# Patient Record
Sex: FEMALE | Race: NATIVE HAWAIIAN OR OTHER PACIFIC ISLANDER | HISPANIC OR LATINO
[De-identification: names, ages, dates, MRNs, and addresses within clinical notes are randomized per-mention and may not be internally consistent; named-entity substitution may affect disease eponyms.]

---

## 2017-06-19 ENCOUNTER — IMAGING SERVICES (OUTPATIENT)
Dept: OTHER | Age: 47
End: 2017-06-19

## 2017-07-11 ENCOUNTER — IMAGING SERVICES (OUTPATIENT)
Dept: OTHER | Age: 47
End: 2017-07-11

## 2018-06-25 ENCOUNTER — IMAGING SERVICES (OUTPATIENT)
Dept: OTHER | Age: 48
End: 2018-06-25

## 2019-07-01 ENCOUNTER — IMAGING SERVICES (OUTPATIENT)
Dept: OTHER | Age: 49
End: 2019-07-01

## 2021-07-08 ENCOUNTER — IMAGING SERVICES (OUTPATIENT)
Dept: OTHER | Age: 51
End: 2021-07-08

## 2022-01-11 ENCOUNTER — TELEPHONE (OUTPATIENT)
Dept: FAMILY MEDICINE CLINIC | Facility: CLINIC | Age: 52
End: 2022-01-11

## 2022-01-11 NOTE — TELEPHONE ENCOUNTER
PT NEEDS REFILL ON-    NIFEDIPINE ER 30MG    HAS A NEW PATIENT APPOINTMENT WITH MM 01/26/22. BUT IS OUT OF MEDICATION.       PLEASE ADVISE- [facility] [address] [place] [address], [address] [facility], [phone], [phone]

## 2022-01-11 NOTE — TELEPHONE ENCOUNTER
Patient has never been seen by an EMG provider.   Future Appointments   Date Time Provider Andrzej Villarreal   1/26/2022  1:20 PM Zachery Green MD [facility]     Patient advised to contact her former provider and have them send a short

## 2022-01-26 ENCOUNTER — OFFICE VISIT (OUTPATIENT)
Dept: FAMILY MEDICINE CLINIC | Facility: CLINIC | Age: 52
End: 2022-01-26
Payer: OTHER GOVERNMENT

## 2022-01-26 VITALS
HEIGHT: 61 IN | HEART RATE: 93 BPM | TEMPERATURE: 97 F | DIASTOLIC BLOOD PRESSURE: 90 MMHG | WEIGHT: 113.38 LBS | SYSTOLIC BLOOD PRESSURE: 120 MMHG | OXYGEN SATURATION: 99 % | BODY MASS INDEX: 21.41 KG/M2

## 2022-01-26 DIAGNOSIS — E78.00 HYPERCHOLESTEROLEMIA: ICD-10-CM

## 2022-01-26 DIAGNOSIS — Z86.2 HISTORY OF ANEMIA: ICD-10-CM

## 2022-01-26 DIAGNOSIS — Z83.3 FAMILY HISTORY OF DIABETES MELLITUS: ICD-10-CM

## 2022-01-26 DIAGNOSIS — I10 PRIMARY HYPERTENSION: Primary | ICD-10-CM

## 2022-01-26 DIAGNOSIS — L40.9 PSORIASIS: ICD-10-CM

## 2022-01-26 DIAGNOSIS — Z82.49 FAMILY HISTORY OF HEART DISEASE: ICD-10-CM

## 2022-01-26 LAB
ALBUMIN SERPL-MCNC: 3.9 G/DL (ref 3.4–5)
ALBUMIN/GLOB SERPL: 0.9 {RATIO} (ref 1–2)
ALP LIVER SERPL-CCNC: 65 U/L
ALT SERPL-CCNC: 14 U/L
ANION GAP SERPL CALC-SCNC: 3 MMOL/L (ref 0–18)
AST SERPL-CCNC: 16 U/L (ref 15–37)
BASOPHILS # BLD AUTO: 0.06 X10(3) UL (ref 0–0.2)
BASOPHILS NFR BLD AUTO: 1 %
BILIRUB SERPL-MCNC: 0.3 MG/DL (ref 0.1–2)
BUN BLD-MCNC: 10 MG/DL (ref 7–18)
CALCIUM BLD-MCNC: 8.7 MG/DL (ref 8.5–10.1)
CHLORIDE SERPL-SCNC: 106 MMOL/L (ref 98–112)
CHOLEST SERPL-MCNC: 254 MG/DL (ref ?–200)
CO2 SERPL-SCNC: 29 MMOL/L (ref 21–32)
CREAT BLD-MCNC: 0.54 MG/DL
EOSINOPHIL # BLD AUTO: 0.25 X10(3) UL (ref 0–0.7)
EOSINOPHIL NFR BLD AUTO: 4 %
ERYTHROCYTE [DISTWIDTH] IN BLOOD BY AUTOMATED COUNT: 16.5 %
EST. AVERAGE GLUCOSE BLD GHB EST-MCNC: 108 MG/DL (ref 68–126)
FASTING PATIENT LIPID ANSWER: NO
FASTING STATUS PATIENT QL REPORTED: NO
GLOBULIN PLAS-MCNC: 4.3 G/DL (ref 2.8–4.4)
GLUCOSE BLD-MCNC: 95 MG/DL (ref 70–99)
HBA1C MFR BLD: 5.4 % (ref ?–5.7)
HCT VFR BLD AUTO: 42.8 %
HDLC SERPL-MCNC: 66 MG/DL (ref 40–59)
HGB BLD-MCNC: 13.2 G/DL
IMM GRANULOCYTES # BLD AUTO: 0.03 X10(3) UL (ref 0–1)
IMM GRANULOCYTES NFR BLD: 0.5 %
LDLC SERPL CALC-MCNC: 174 MG/DL (ref ?–100)
LYMPHOCYTES # BLD AUTO: 2.32 X10(3) UL (ref 1–4)
LYMPHOCYTES NFR BLD AUTO: 37.4 %
MCH RBC QN AUTO: 27.1 PG (ref 26–34)
MCHC RBC AUTO-ENTMCNC: 30.8 G/DL (ref 31–37)
MCV RBC AUTO: 87.9 FL
MONOCYTES # BLD AUTO: 0.27 X10(3) UL (ref 0.1–1)
MONOCYTES NFR BLD AUTO: 4.3 %
NEUTROPHILS # BLD AUTO: 3.28 X10 (3) UL (ref 1.5–7.7)
NEUTROPHILS # BLD AUTO: 3.28 X10(3) UL (ref 1.5–7.7)
NEUTROPHILS NFR BLD AUTO: 52.8 %
NONHDLC SERPL-MCNC: 188 MG/DL (ref ?–130)
OSMOLALITY SERPL CALC.SUM OF ELEC: 285 MOSM/KG (ref 275–295)
PLATELET # BLD AUTO: 358 10(3)UL (ref 150–450)
POTASSIUM SERPL-SCNC: 3.8 MMOL/L (ref 3.5–5.1)
PROT SERPL-MCNC: 8.2 G/DL (ref 6.4–8.2)
RBC # BLD AUTO: 4.87 X10(6)UL
SODIUM SERPL-SCNC: 138 MMOL/L (ref 136–145)
TRIGL SERPL-MCNC: 81 MG/DL (ref 30–149)
VLDLC SERPL CALC-MCNC: 16 MG/DL (ref 0–30)
WBC # BLD AUTO: 6.2 X10(3) UL (ref 4–11)

## 2022-01-26 PROCEDURE — 85025 COMPLETE CBC W/AUTO DIFF WBC: CPT | Performed by: FAMILY MEDICINE

## 2022-01-26 PROCEDURE — 80053 COMPREHEN METABOLIC PANEL: CPT | Performed by: FAMILY MEDICINE

## 2022-01-26 PROCEDURE — 80061 LIPID PANEL: CPT | Performed by: FAMILY MEDICINE

## 2022-01-26 PROCEDURE — 83036 HEMOGLOBIN GLYCOSYLATED A1C: CPT | Performed by: FAMILY MEDICINE

## 2022-01-26 PROCEDURE — 3008F BODY MASS INDEX DOCD: CPT | Performed by: FAMILY MEDICINE

## 2022-01-26 PROCEDURE — 3074F SYST BP LT 130 MM HG: CPT | Performed by: FAMILY MEDICINE

## 2022-01-26 PROCEDURE — 3080F DIAST BP >= 90 MM HG: CPT | Performed by: FAMILY MEDICINE

## 2022-01-26 PROCEDURE — 99203 OFFICE O/P NEW LOW 30 MIN: CPT | Performed by: FAMILY MEDICINE

## 2022-01-26 RX ORDER — HYDROXYZINE HYDROCHLORIDE 10 MG/1
10 TABLET, FILM COATED ORAL 3 TIMES DAILY PRN
COMMUNITY

## 2022-01-26 RX ORDER — TOLTERODINE 2 MG/1
2 CAPSULE, EXTENDED RELEASE ORAL DAILY
COMMUNITY

## 2022-01-26 RX ORDER — CLOBETASOL PROPIONATE 0.5 MG/G
OINTMENT TOPICAL 2 TIMES DAILY
COMMUNITY

## 2022-01-26 RX ORDER — NIFEDIPINE 30 MG/1
30 TABLET, FILM COATED, EXTENDED RELEASE ORAL DAILY
COMMUNITY

## 2022-01-26 RX ORDER — CLOBETASOL PROPIONATE 0.46 MG/ML
SOLUTION TOPICAL 2 TIMES DAILY
COMMUNITY

## 2022-01-26 NOTE — PROGRESS NOTES
[facility] Family Medicine Office Note  Chief Complaint:   Patient presents with:  Establish Care      HPI:   This is a 49year old female coming in [place] [address] care with me today - moved from [facility].      Follows dermatologist for her Psorias Secukinumab (COSENTYX SC) Inject into the skin. • hydrOXYzine 10 MG Oral Tab Take 10 mg by mouth 3 (three) times daily as needed for Itching. • Clobetasol Propionate 0.05 % External Ointment Apply topically 2 (two) times daily.      • Clobetasol Pro care with Derm. - DERM - INTERNAL    RHM discussed and the need for PAP and physical discussed. Labs obtained today - will review this - lIpids, CT heart score and her history to discussed the need for statin, considering strong FH of the same.        P

## 2022-01-31 ENCOUNTER — TELEPHONE (OUTPATIENT)
Dept: FAMILY MEDICINE CLINIC | Facility: CLINIC | Age: 52
End: 2022-01-31

## 2022-01-31 NOTE — TELEPHONE ENCOUNTER
[address] [facility] called and asked that a referral be sent for [facility] to see Dr. Elicia Jessica

## 2022-01-31 NOTE — TELEPHONE ENCOUNTER
Left message for Darian Patino to let us know when pt's appointment is. Note sent to referrals department to check on status of referral.        Need fax number to send order.

## 2022-02-02 NOTE — TELEPHONE ENCOUNTER
Derm referral approved. Details in Epic. Start Date 1/26/22 through 1/26/23.  3 visits authorized. Referral #[id number]. Note included in referral can see another provider. Referral faxed to # below.

## 2022-02-02 NOTE — TELEPHONE ENCOUNTER
Yulia Caraballo from [employer] called and requested that the actual referral from [address] [place] [address] be entered into the [address] and then faxed to her at [phone].

## 2022-02-03 NOTE — TELEPHONE ENCOUNTER
Referra;l request faxed to [facility] @ [phone] to see  Dr. Jose Perera Md NPI [phone]

## 2022-02-03 NOTE — TELEPHONE ENCOUNTER
[facility] again states received referral but due to pt having [place] referral has to come from [facility] otherwise can not be accepted referral received is from [employer] back # [phone]    Fax# [phone]

## 2022-02-08 RX ORDER — NIFEDIPINE 30 MG/1
30 TABLET, FILM COATED, EXTENDED RELEASE ORAL DAILY
Qty: 90 TABLET | Refills: 1 | Status: SHIPPED | OUTPATIENT
Start: 2022-02-08

## 2022-02-08 RX ORDER — TOLTERODINE 2 MG/1
2 CAPSULE, EXTENDED RELEASE ORAL DAILY
Qty: 90 CAPSULE | Refills: 1 | Status: SHIPPED | OUTPATIENT
Start: 2022-02-08 | End: 2022-02-09

## 2022-02-08 NOTE — TELEPHONE ENCOUNTER
No refill protocol for this medication. tolterodine tartrate   Last refill: Dr. Lisa Franco has never refilled this medication. Last Visit: 1/26/2022  Next Visit:   Future Appointments   Date Time Provider Andrzej Cherelle   3/30/2022  1:20 PM Zachery Foreman MD [facility]       Nifedipine has been sent per protocol. Forward to Dr. Lisa Franco please advise on refills. Thanks.

## 2022-02-08 NOTE — TELEPHONE ENCOUNTER
NIFEdipine 30 MG Oral Tablet 24 Hr    tolterodine tartrate 2 MG Oral Capsule  [facility] [phone], [phone]      Pt called requesting a refill on medication     Pt call back # [phone]    Thank you

## 2022-02-09 ENCOUNTER — TELEPHONE (OUTPATIENT)
Dept: FAMILY MEDICINE CLINIC | Facility: CLINIC | Age: 52
End: 2022-02-09

## 2022-02-09 RX ORDER — TOLTERODINE 2 MG/1
2 CAPSULE, EXTENDED RELEASE ORAL DAILY
Qty: 90 CAPSULE | Refills: 1 | Status: SHIPPED | OUTPATIENT
Start: 2022-02-09

## 2022-02-09 NOTE — TELEPHONE ENCOUNTER
[facility] [phone] Spoke with Domingo Boyce and verified that the referral was approved valid 2/3/22-2/3/23  Ref# [id number]

## 2022-02-09 NOTE — TELEPHONE ENCOUNTER
Patient called in requesting that both of her [facility].  Please Fix    Thank you      tolterodine tartrate 2 MG Oral Capsule SR 24 Hr  NIFEdipine 30 MG Oral Tablet 24 Hr

## 2022-03-02 ENCOUNTER — HOSPITAL ENCOUNTER (OUTPATIENT)
Dept: CT IMAGING | Age: 52
Discharge: HOME | End: 2022-03-02
Attending: FAMILY MEDICINE

## 2022-03-02 DIAGNOSIS — Z82.49 FAMILY HISTORY OF HEART DISEASE: ICD-10-CM

## 2022-03-02 DIAGNOSIS — I10 PRIMARY HYPERTENSION: ICD-10-CM

## 2022-03-04 NOTE — PROGRESS NOTES
She just had her physical in Jan 2022. If she has further questions regarding the medication. I would be happy to discuss further on a video visit.  Agree with plan as above. ~ MM

## 2022-03-07 ENCOUNTER — TELEPHONE (OUTPATIENT)
Dept: FAMILY MEDICINE CLINIC | Facility: CLINIC | Age: 52
End: 2022-03-07

## 2022-03-07 RX ORDER — ATORVASTATIN CALCIUM 10 MG/1
10 TABLET, FILM COATED ORAL NIGHTLY
Qty: 90 TABLET | Refills: 0 | Status: SHIPPED | OUTPATIENT
Start: 2022-03-07 | End: 2022-06-05

## 2022-03-07 NOTE — TELEPHONE ENCOUNTER
If she starts it now we can consider rechecking at that time. If patient is ok with this please send Rx Atorvastatin 10 mg once daily X 90 days for now.  We can recheck her labs when she comes in for her physical.

## 2022-03-07 NOTE — TELEPHONE ENCOUNTER
PT called to reschedule appointment. She wanted to know, however, if she should be starting cholesterol medication before appointment. Please advise.     Call back number:  [phone]

## 2022-03-07 NOTE — TELEPHONE ENCOUNTER
Reviewed LOV 01/26/2022  Per MM \"Last time labs were checked 2-3 years ago cholesterol was elevated. Everything else looked good per patient, ?  Unsure\"    Last lipid panel 01/26/2022    Completed CT Calcium Scoring 03/02/2022    Pt inquiring if should start statin medication prior to future appt - annual physical  Future Appointments   Date Time Provider Andrzej Villarreal   4/27/2022  1:20 PM Ed Ahumada, Mydhili, MD [facility]     Please advise, thank you

## 2022-03-30 ENCOUNTER — HOSPITAL ENCOUNTER (OUTPATIENT)
Age: 52
Discharge: HOME | End: 2022-03-30
Payer: OTHER GOVERNMENT

## 2022-03-30 ENCOUNTER — TELEPHONE (OUTPATIENT)
Dept: FAMILY MEDICINE CLINIC | Facility: CLINIC | Age: 52
End: 2022-03-30

## 2022-03-30 VITALS
HEIGHT: 61 IN | TEMPERATURE: 99 F | SYSTOLIC BLOOD PRESSURE: 143 MMHG | HEART RATE: 103 BPM | WEIGHT: 113 LBS | OXYGEN SATURATION: 95 % | BODY MASS INDEX: 21.34 KG/M2 | DIASTOLIC BLOOD PRESSURE: 84 MMHG | RESPIRATION RATE: 12 BRPM

## 2022-03-30 DIAGNOSIS — R10.9 ABDOMINAL PAIN OF UNKNOWN ETIOLOGY: Primary | ICD-10-CM

## 2022-03-30 DIAGNOSIS — K62.5 RECTAL BLEEDING: ICD-10-CM

## 2022-03-30 PROCEDURE — 99205 OFFICE O/P NEW HI 60 MIN: CPT | Performed by: NURSE PRACTITIONER

## 2022-03-30 RX ORDER — CETIRIZINE HYDROCHLORIDE 10 MG/1
10 TABLET ORAL DAILY
COMMUNITY

## 2022-03-30 NOTE — TELEPHONE ENCOUNTER
Pt reports last night - c/o stomach hurting really bad - around 8pm    Would feel stomach like \"growling\" - then abd hurts really bad    Even buttocks really hurting - \"like she gave birth\"    Went to bathroom - no bowel movement, but threw up    After 1 hr or so - had half diarrhea, half formed bowel movement    When using toilet - c/o really sweating  When done - pt c/o chills, really cold    Used bathroom today - had Pink-red mixed watery blood - in toilet bowl    Pt denies any new foods or changes to diet    Advised pt that Dr. Patiño Re not in office today, but will send to covering providers    Please advise, thank you

## 2022-03-30 NOTE — ED INITIAL ASSESSMENT (HPI)
Pt sts last night began with diffuse abd and lower back pain. Nausea, emesis x 4, many diarrhea episodes. This morning with rectal pressure and then bloody stools \"every 30\"-1 hour. \". Reports urinary frequency but no dysuria. Denies fever.

## 2022-03-30 NOTE — TELEPHONE ENCOUNTER
Having abdominal pain w/ diarrhea    Also, reports some blood in stool    Vomiting last night but not today    Please adv    Thank you

## 2022-04-27 ENCOUNTER — OFFICE VISIT (OUTPATIENT)
Dept: FAMILY MEDICINE CLINIC | Facility: CLINIC | Age: 52
End: 2022-04-27
Payer: OTHER GOVERNMENT

## 2022-04-27 ENCOUNTER — MED REC SCAN ONLY (OUTPATIENT)
Dept: FAMILY MEDICINE CLINIC | Facility: CLINIC | Age: 52
End: 2022-04-27

## 2022-04-27 VITALS
HEIGHT: 61 IN | TEMPERATURE: 98 F | RESPIRATION RATE: 16 BRPM | WEIGHT: 111 LBS | DIASTOLIC BLOOD PRESSURE: 80 MMHG | SYSTOLIC BLOOD PRESSURE: 124 MMHG | BODY MASS INDEX: 20.96 KG/M2 | HEART RATE: 82 BPM | OXYGEN SATURATION: 98 %

## 2022-04-27 DIAGNOSIS — Z00.00 WELLNESS EXAMINATION: Primary | ICD-10-CM

## 2022-04-27 DIAGNOSIS — E78.00 HYPERCHOLESTEROLEMIA: ICD-10-CM

## 2022-04-27 DIAGNOSIS — Z87.19 HISTORY OF COLITIS: ICD-10-CM

## 2022-04-27 DIAGNOSIS — Z01.419 ENCOUNTER FOR WELL WOMAN EXAM: ICD-10-CM

## 2022-04-27 DIAGNOSIS — I10 PRIMARY HYPERTENSION: ICD-10-CM

## 2022-04-27 DIAGNOSIS — Z12.31 SCREENING MAMMOGRAM FOR BREAST CANCER: ICD-10-CM

## 2022-04-27 DIAGNOSIS — R93.1 AGATSTON CAC SCORE 200-399: ICD-10-CM

## 2022-04-27 DIAGNOSIS — Z12.4 PAPANICOLAOU SMEAR FOR CERVICAL CANCER SCREENING: ICD-10-CM

## 2022-04-27 DIAGNOSIS — Z12.11 ENCOUNTER FOR SCREENING COLONOSCOPY: ICD-10-CM

## 2022-04-27 PROBLEM — L40.9 PSORIASIS: Status: ACTIVE | Noted: 2022-04-27

## 2022-04-27 PROBLEM — N32.81 OVERACTIVE BLADDER: Status: ACTIVE | Noted: 2022-04-27

## 2022-04-27 PROBLEM — E78.2 MIXED HYPERLIPIDEMIA: Status: ACTIVE | Noted: 2022-04-27

## 2022-04-27 LAB
CYTOLOGY CVX/VAG DOC THIN PREP: NORMAL
HPV16+18+45 E6+E7MRNA CVX NAA+PROBE: NEGATIVE

## 2022-04-27 PROCEDURE — 3079F DIAST BP 80-89 MM HG: CPT | Performed by: FAMILY MEDICINE

## 2022-04-27 PROCEDURE — 3008F BODY MASS INDEX DOCD: CPT | Performed by: FAMILY MEDICINE

## 2022-04-27 PROCEDURE — 90471 IMMUNIZATION ADMIN: CPT | Performed by: FAMILY MEDICINE

## 2022-04-27 PROCEDURE — 90750 HZV VACC RECOMBINANT IM: CPT | Performed by: FAMILY MEDICINE

## 2022-04-27 PROCEDURE — 87624 HPV HI-RISK TYP POOLED RSLT: CPT | Performed by: FAMILY MEDICINE

## 2022-04-27 PROCEDURE — 3074F SYST BP LT 130 MM HG: CPT | Performed by: FAMILY MEDICINE

## 2022-04-27 PROCEDURE — 99396 PREV VISIT EST AGE 40-64: CPT | Performed by: FAMILY MEDICINE

## 2022-04-28 ENCOUNTER — TELEPHONE (OUTPATIENT)
Dept: FAMILY MEDICINE CLINIC | Facility: CLINIC | Age: 52
End: 2022-04-28

## 2022-04-28 LAB — HPV I/H RISK 1 DNA SPEC QL NAA+PROBE: NEGATIVE

## 2022-04-28 NOTE — TELEPHONE ENCOUNTER
Patient states that [employer] office does not except [address] [place] [address] Way, only  for life.  Patient called  and they informed her that they havent received referral.

## 2022-04-28 NOTE — TELEPHONE ENCOUNTER
[facility] Customer Service - [phone]    Staff stated that regular turn around time with referral authorizations is 2-3 days - referral orders only placed yesterday  Was only able to see Derm referral - this was placed 01/2022    Advised of note below - staff stated that if referral approved or denied - pt will have to call [address] [place] [address] Way - may approve out of network provider if pt has not seen provider in past - otherwise, awaiting referral auth at this time      Routing to referrals dept - please advise, thank you!

## 2022-04-29 ENCOUNTER — TELEPHONE (OUTPATIENT)
Dept: FAMILY MEDICINE CLINIC | Facility: CLINIC | Age: 52
End: 2022-04-29

## 2022-04-29 NOTE — TELEPHONE ENCOUNTER
Patient returning call this morning. I informed patient to listen to her VM as it was stated that a detail message was left on her VM. I informed patient to call her insurance [facility] and see who is in her network as far as [employer] and to call us back with that information, and or see what the out of network costs would be.

## 2022-04-29 NOTE — TELEPHONE ENCOUNTER
Advised by referrals dept - They authorize for Tracking only for [employer] Pt needs to find provider that takes her insurance. Once pt finds provider, will need to update referral with correct provider. Left detailed message to voicemail (per verbal release form consent with confirmed identifying message) of note above. Patient advised to call office back with GI provider and/or with any questions/concerns.

## 2022-05-02 ENCOUNTER — TELEPHONE (OUTPATIENT)
Dept: FAMILY MEDICINE CLINIC | Facility: CLINIC | Age: 52
End: 2022-05-02

## 2022-05-02 NOTE — TELEPHONE ENCOUNTER
Called and spoke with pt - advised of new GI referral for Dr. Joe Galvin stated \"Dr. Chery Quezada at [facility]" -  Unable to locate this provider    Pt was also given name - Jazmyn Lepe MD at [facility] from insurance     New referral placed for Jazmyn Lepe MD    Pt agreeable to receive [facility] with contact info

## 2022-05-02 NOTE — TELEPHONE ENCOUNTER
PATIENT STATES THAT HER INSURANCE WON'T COVER THE GASTRO DR REFERRED HER TO, INSURANCE WILL COVER DR Digna PAUL AT [address] [facility] PUT IN NEW REFERRAL

## 2022-05-07 ENCOUNTER — TELEPHONE (OUTPATIENT)
Dept: FAMILY MEDICINE CLINIC | Facility: CLINIC | Age: 52
End: 2022-05-07

## 2022-05-16 ENCOUNTER — TELEPHONE (OUTPATIENT)
Dept: FAMILY MEDICINE CLINIC | Facility: CLINIC | Age: 52
End: 2022-05-16

## 2022-05-16 NOTE — TELEPHONE ENCOUNTER
----- Message from Zachery Zavala MD sent at 5/14/2022 11:28 PM CDT -----  Pap smear - normal and no evidence of the high risk HPV virus. We still highly recommend annual physical exams, however, if there are no changes to sexual health / history PAP smears may be done every 3-5 years.

## 2022-06-06 ENCOUNTER — TELEPHONE (OUTPATIENT)
Dept: FAMILY MEDICINE CLINIC | Facility: CLINIC | Age: 52
End: 2022-06-06

## 2022-06-06 NOTE — TELEPHONE ENCOUNTER
Letter mailed to patient reminding her she has overdue orders.     Lab Frequency Next Occurrence   LEOLA SCREENING BILAT (CPT=77067) Once 04/27/2022

## 2022-06-08 RX ORDER — ATORVASTATIN CALCIUM 10 MG/1
10 TABLET, FILM COATED ORAL NIGHTLY
Qty: 90 TABLET | Refills: 1 | Status: SHIPPED | OUTPATIENT
Start: 2022-06-08 | End: 2022-12-05

## 2022-06-08 NOTE — TELEPHONE ENCOUNTER
LOV 04/27/2022  Last labs 01/26/2022  Last refill on 03/07/2022, for #90 tabs, with 0 refills  atorvastatin 10 MG Oral Tab   Cholesterol Medication Protocol Passed 06/08/2022 01:57 PM   Protocol Details  ALT < 80    ALT resulted within past year    Lipid panel within past 12 months    Appointment within past 12 or next 3 months     Future Appointments   Date Time Provider Andrzej Villarreal   6/25/2022  8:30 AM  [facility] EM[facility]   7/21/2022  2:45 [place] [address] [address] SUB GI       Order per protocol  [facility]

## 2022-06-08 NOTE — TELEPHONE ENCOUNTER
PT CALLED AND ADV IS NEEDING REFILL OF     atorvastatin 10 MG Oral Tab     ** PT WANTED TO KNOW IF THIS IS A MEDICATION THAT SHE IS SUPPOSE TO CONTINUE TO TAKE? **      PLEASE ADV      WOULD NEED 90 DAY SUPPLY SENT TO [facility]      THANK YOU

## 2022-06-25 ENCOUNTER — NURSE ONLY (OUTPATIENT)
Dept: FAMILY MEDICINE CLINIC | Facility: CLINIC | Age: 52
End: 2022-06-25
Payer: OTHER GOVERNMENT

## 2022-06-25 DIAGNOSIS — E78.00 HYPERCHOLESTEROLEMIA: ICD-10-CM

## 2022-06-25 LAB
CHOLEST SERPL-MCNC: 176 MG/DL (ref ?–200)
FASTING PATIENT LIPID ANSWER: YES
HDLC SERPL-MCNC: 68 MG/DL (ref 40–59)
LDLC SERPL CALC-MCNC: 95 MG/DL (ref ?–100)
NONHDLC SERPL-MCNC: 108 MG/DL (ref ?–130)
TRIGL SERPL-MCNC: 69 MG/DL (ref 30–149)
VLDLC SERPL CALC-MCNC: 11 MG/DL (ref 0–30)

## 2022-06-25 PROCEDURE — 80061 LIPID PANEL: CPT | Performed by: FAMILY MEDICINE

## 2022-06-25 PROCEDURE — 90471 IMMUNIZATION ADMIN: CPT | Performed by: FAMILY MEDICINE

## 2022-06-25 PROCEDURE — 90750 HZV VACC RECOMBINANT IM: CPT | Performed by: FAMILY MEDICINE

## 2022-06-25 NOTE — PROGRESS NOTES
Pt was in office for labs per MM and 2nd shingles vaccination    1 mint tbue collected from [facility] using butterfly needle. Pt also received 2nd singles vaccination- order placed by Dr. Wilian Nguyễn. Vaccination was given in L deltoid .     Pt tolerated and was sent home in stable condition

## 2022-07-13 ENCOUNTER — ORDER TRANSCRIPTION (OUTPATIENT)
Dept: ADMINISTRATIVE | Facility: HOSPITAL | Age: 52
End: 2022-07-13

## 2022-07-13 DIAGNOSIS — Z01.818 PRE-OP TESTING: ICD-10-CM

## 2022-07-13 DIAGNOSIS — Z11.59 ENCOUNTER FOR SCREENING FOR OTHER VIRAL DISEASES: Primary | ICD-10-CM

## 2022-07-14 ENCOUNTER — TELEPHONE (OUTPATIENT)
Dept: FAMILY MEDICINE CLINIC | Facility: CLINIC | Age: 52
End: 2022-07-14

## 2022-07-14 DIAGNOSIS — Z87.19 HISTORY OF COLITIS: ICD-10-CM

## 2022-07-14 DIAGNOSIS — Z12.11 ENCOUNTER FOR SCREENING COLONOSCOPY: Primary | ICD-10-CM

## 2022-07-14 NOTE — TELEPHONE ENCOUNTER
PATIENT SCHEDULED FOR COLONOSCOPY ON July 21 WITH  [facility]. PATIENT NOT SEEING DR TIERNEY.  PATIENT ASKING FOR REFERRAL

## 2022-07-14 NOTE — TELEPHONE ENCOUNTER
Please refer to Danielle 04/28/22, 04/29/22, 05/02/22  (Dr. Raquel Del Rio referral - pt stated not in network, new GI referral orders placed)  Unsure how pt able to schedule with Dr. Raquel Del Rio    Attempted to call pt - unavailable at this time - spoke with spouse - ok per verbal consent    Advised pt's spouse of note above - regarding GI referrals placed - stating Dr. Raquel Del Rio is NOT covered   So new GI referral placed for Dr. Angeline Caraballo asked if pt can call office back to discuss - awaiting call back from pt at this time

## 2022-07-25 ENCOUNTER — TELEPHONE (OUTPATIENT)
Dept: FAMILY MEDICINE CLINIC | Facility: CLINIC | Age: 52
End: 2022-07-25

## 2022-07-25 NOTE — TELEPHONE ENCOUNTER
PT CALLED AND ADV NEEDS NEW REFERRAL PLACED FOR GASTRO  PT ADV  [facility] DOES NOT ACCEPT  [facility].    WAS GIVEN A DR FROM [facility]:    DR Hiwot Blackman    [facility] [address]  [employer] [place]  -[phone]  FAX; [address] [facility]

## 2022-07-25 NOTE — TELEPHONE ENCOUNTER
GI referral for Dr. Cedric Valencia - placed on 05/02/2022    Faxed Referral order requisition to Dr. Corrine Fernández at fax #[phone]    [facility] sent to pt  Notify me if not read by 8/1/22

## 2022-08-09 RX ORDER — NIFEDIPINE 30 MG/1
30 TABLET, FILM COATED, EXTENDED RELEASE ORAL DAILY
Qty: 90 TABLET | Refills: 1 | Status: SHIPPED | OUTPATIENT
Start: 2022-08-09

## 2022-08-09 RX ORDER — TOLTERODINE 2 MG/1
2 CAPSULE, EXTENDED RELEASE ORAL DAILY
Qty: 90 CAPSULE | Refills: 1 | Status: SHIPPED | OUTPATIENT
Start: 2022-08-09

## 2022-08-09 NOTE — TELEPHONE ENCOUNTER
Requests refill      NIFEdipine 30 MG Oral Tablet 24 Hr    tolterodine tartrate 2 MG Oral Capsule SR 24 Hr    [facility]

## 2022-08-09 NOTE — TELEPHONE ENCOUNTER
Nifedipine sent per protocol. No refill protocol for this medication. Tolterodine:  Last refill: 2/09/2022 #90 with 1 refill  Last Visit: 4/27/2022  Next Visit:   No Future Appointments         Forward to Dr. Jarod Frederick please advise on refills. Thanks.

## 2022-08-20 ENCOUNTER — HOSPITAL ENCOUNTER (OUTPATIENT)
Dept: MAMMOGRAPHY | Age: 52
Discharge: HOME | End: 2022-08-20
Attending: FAMILY MEDICINE
Payer: OTHER GOVERNMENT

## 2022-08-20 ENCOUNTER — IMAGING SERVICES (OUTPATIENT)
Dept: OTHER | Age: 52
End: 2022-08-20

## 2022-08-20 DIAGNOSIS — Z12.31 SCREENING MAMMOGRAM FOR BREAST CANCER: ICD-10-CM

## 2022-08-20 PROCEDURE — 77063 BREAST TOMOSYNTHESIS BI: CPT | Performed by: FAMILY MEDICINE

## 2022-08-20 PROCEDURE — 77067 SCR MAMMO BI INCL CAD: CPT | Performed by: FAMILY MEDICINE

## 2022-09-21 ENCOUNTER — LAB ENCOUNTER (OUTPATIENT)
Dept: LAB | Age: 52
End: 2022-09-21
Attending: INTERNAL MEDICINE

## 2022-09-21 DIAGNOSIS — Z11.59 ENCOUNTER FOR SCREENING FOR OTHER VIRAL DISEASES: ICD-10-CM

## 2022-09-21 DIAGNOSIS — Z01.818 PRE-OP TESTING: ICD-10-CM

## 2022-09-22 LAB — SARS-COV-2 RNA RESP QL NAA+PROBE: NOT DETECTED

## 2022-09-24 ENCOUNTER — HOSPITAL ENCOUNTER (OUTPATIENT)
Dept: CV DIAGNOSTICS | Facility: HOSPITAL | Age: 52
Discharge: HOME | End: 2022-09-24
Attending: INTERNAL MEDICINE

## 2022-09-24 DIAGNOSIS — R93.1 AGATSTON CORONARY ARTERY CALCIUM SCORE BETWEEN 200 AND 399: ICD-10-CM

## 2022-09-24 DIAGNOSIS — E78.2 MIXED HYPERLIPIDEMIA: ICD-10-CM

## 2022-09-24 DIAGNOSIS — I10 ESSENTIAL HYPERTENSION: ICD-10-CM

## 2022-09-24 PROCEDURE — 93350 STRESS TTE ONLY: CPT | Performed by: INTERNAL MEDICINE

## 2022-09-24 PROCEDURE — 93018 CV STRESS TEST I&R ONLY: CPT | Performed by: INTERNAL MEDICINE

## 2022-09-24 PROCEDURE — 93017 CV STRESS TEST TRACING ONLY: CPT | Performed by: INTERNAL MEDICINE

## 2022-09-26 ENCOUNTER — TELEPHONE (OUTPATIENT)
Dept: FAMILY MEDICINE CLINIC | Facility: CLINIC | Age: 52
End: 2022-09-26

## 2022-09-26 NOTE — TELEPHONE ENCOUNTER
Patient states that [facility] did not receive the prior authorization for Dr. Ngozi Byrd for her colonoscopy tomorrow. The patient is already fasting.      [phone]

## 2022-09-26 NOTE — TELEPHONE ENCOUNTER
Dr. Santiago Chandra office states pt needs a referral. They say they can see referral in system but insurance is stating it has to be initiated through  by Dr. Clifford Otero. [employer] phone:  [phone]. Dr. Santiago Chandra office is cancelling pt's procedure until all is cleared. Thank you!

## 2022-09-26 NOTE — TELEPHONE ENCOUNTER
Attempted to call [facility] at number provided, automated system, when choosing provider, asks to key in \"code\". Not aware of what the \"code\" number is. Immediately attempted to call Emily Pepper at Dr. Gema Rosado office at [phone], answering service states they are on lunch until 1300.     1315-Called and spoke with Emily Pepper at Dr. Gema Rosado office. Discussed referral process and that initial referral has been placed, normal process is specialist then places and initiates all referrals for procedures. Emily Pepper to call [facility] back to find solution. 1323-Called and spoke to pt to let her know that case is being worked on, either Emily Pepper or I will call back with answer.

## 2022-09-26 NOTE — TELEPHONE ENCOUNTER
Rec'd fax from [facility]. Paperwork filled out and faxed to [phone]. Will send to scanning, copy kept at desk for any immediate needs.

## 2022-09-26 NOTE — TELEPHONE ENCOUNTER
Rec'd callback from Jesse Gill at Dr. Ivis Alexander office. States she spoke with [facility], they are requiring the PCP office to fill out a paper request and fax it to [address] [place] [address]. Once it is in their system, Dr. Ivis Alexander office can try to reapprove colonscopy. Awaiting fax.

## 2022-10-31 ENCOUNTER — TELEPHONE (OUTPATIENT)
Dept: FAMILY MEDICINE CLINIC | Facility: CLINIC | Age: 52
End: 2022-10-31

## 2022-10-31 NOTE — TELEPHONE ENCOUNTER
Pt states that  [employer]) never got referral letter for Dr. Lora Johnson - cardiologist. Pt needs referral letter sent to [address] [place] [address]. Thank you!

## 2022-10-31 NOTE — TELEPHONE ENCOUNTER
[address] Patient Referral Authorization Form    Form completed - referral order requisition (placed on 04/27/2022 for Cards, Dr. Ezequiel Zelaya)  Faxed to [facility] at fax #[phone]    Send copy to [facility] sent to pt  Notify me if not read by 11/07/22

## 2022-11-28 ENCOUNTER — TELEPHONE (OUTPATIENT)
Dept: FAMILY MEDICINE CLINIC | Facility: CLINIC | Age: 52
End: 2022-11-28

## 2022-11-28 DIAGNOSIS — E78.00 HYPERCHOLESTEROLEMIA: ICD-10-CM

## 2022-11-28 DIAGNOSIS — I10 PRIMARY HYPERTENSION: Primary | ICD-10-CM

## 2022-11-28 DIAGNOSIS — R93.1 AGATSTON CAC SCORE 200-399: ICD-10-CM

## 2022-11-28 RX ORDER — ATORVASTATIN CALCIUM 10 MG/1
TABLET, FILM COATED ORAL
Qty: 90 TABLET | Refills: 3 | Status: SHIPPED | OUTPATIENT
Start: 2022-11-28

## 2022-11-28 NOTE — TELEPHONE ENCOUNTER
Pt called she said referral for Dr. Tariq Mcdowell is incomplete per insurance. She did already have a visit with him but does not know when she seen him. She is needing to get that referral as she is getting bills from their office.       Pt is also needing to have  atorvastatin 10 MG Oral Tab  refilled    Pt would like sent to   Shana Ugalde, [facility] [phone], [phone]

## 2022-11-28 NOTE — TELEPHONE ENCOUNTER
LOV 04/27/22  Last labs 06/25/22  Last refill on 06/08/22, for #90 tabs, with 1 refills  atorvastatin 10 MG Oral Tab  Cholesterol Medication Protocol Passed 11/28/2022 11:37 AM   Protocol Details  ALT < 80    ALT resulted within past year    Lipid panel within past 12 months    Appointment within past 12 or next 3 months       No future appointments.     Order per protocol

## 2022-11-30 NOTE — TELEPHONE ENCOUNTER
Cardiology referral order placed  [address] : Patient Referral Authorization Form  Completed and faxed to #[phone]

## 2022-12-01 NOTE — TELEPHONE ENCOUNTER
Received fax from [facility] regarding referral authorization    \"cannot be processed\"  \"beneficiary is  [employer] and must see network providers when [employer] there is no medical necessity showing what non-network (NNW) can provide that network (NW) cannot, beneficiary will be redirected to a NW provider\"

## 2023-01-19 NOTE — TELEPHONE ENCOUNTER
LOV 04/27/22  BP Readings from Last 3 Encounters:  04/27/22 : 124/80  03/30/22 : 143/84  01/26/22 : 120/90      Last refill on 08/09/22, for #90 tabs, with 1 refills  NIFEdipine ER 30 MG Oral Tablet 24 Hr    Last refill on 08/09/22, for #90 caps, with 1 refills  tolterodine ER 2 MG Oral Capsule SR 24 Hr      No future appointments. Order(s) pending, please review. Thank you.

## 2023-01-20 RX ORDER — NIFEDIPINE 30 MG/1
TABLET, FILM COATED, EXTENDED RELEASE ORAL
Qty: 90 TABLET | Refills: 3 | Status: SHIPPED | OUTPATIENT
Start: 2023-01-20

## 2023-01-20 RX ORDER — TOLTERODINE TARTRATE 2 MG/1
CAPSULE, EXTENDED RELEASE ORAL
Qty: 90 CAPSULE | Refills: 3 | Status: SHIPPED | OUTPATIENT
Start: 2023-01-20

## 2023-02-01 ENCOUNTER — MED REC SCAN ONLY (OUTPATIENT)
Dept: FAMILY MEDICINE CLINIC | Facility: CLINIC | Age: 53
End: 2023-02-01

## 2023-02-28 ENCOUNTER — EXTERNAL LAB (OUTPATIENT)
Dept: HEALTH INFORMATION MANAGEMENT | Facility: OTHER | Age: 53
End: 2023-02-28

## 2023-02-28 ENCOUNTER — EXTERNAL RECORD (OUTPATIENT)
Dept: HEALTH INFORMATION MANAGEMENT | Facility: OTHER | Age: 53
End: 2023-02-28

## 2023-02-28 LAB — LAB RESULT: NORMAL

## 2023-07-12 ENCOUNTER — TELEPHONE (OUTPATIENT)
Dept: FAMILY MEDICINE CLINIC | Facility: CLINIC | Age: 53
End: 2023-07-12

## 2023-07-12 DIAGNOSIS — L40.9 PSORIASIS: Primary | ICD-10-CM

## 2023-07-12 DIAGNOSIS — Z09 FOLLOW-UP EXAM: ICD-10-CM

## 2023-07-12 NOTE — TELEPHONE ENCOUNTER
Received fax from [facility] regarding referral request    \". ..please initiate a f/up  [facility] referral... she is scheduled 8/1 with Dr. Cook Simpler"    Hx Derm referral order 01/26/22 from Dr. Frank Neither    Referral order placed per protocol    \"Patient Referral authorization form\" completed, referral order requisition - both faxed to [facility] TXO#[phone]    [facility] sent to pt  Notify me if not read by 07/19/23

## 2023-07-22 ENCOUNTER — MED REC SCAN ONLY (OUTPATIENT)
Dept: FAMILY MEDICINE CLINIC | Facility: CLINIC | Age: 53
End: 2023-07-22

## 2024-03-25 ENCOUNTER — E-ADVICE (OUTPATIENT)
Dept: FAMILY MEDICINE | Age: 54
End: 2024-03-25

## 2024-03-26 ENCOUNTER — TELEPHONE (OUTPATIENT)
Dept: FAMILY MEDICINE | Age: 54
End: 2024-03-26

## 2024-04-27 ENCOUNTER — TELEPHONE (OUTPATIENT)
Dept: FAMILY MEDICINE | Age: 54
End: 2024-04-27

## 2024-07-01 DIAGNOSIS — K21.9 GASTROESOPHAGEAL REFLUX DISEASE, UNSPECIFIED WHETHER ESOPHAGITIS PRESENT: ICD-10-CM

## 2024-07-01 DIAGNOSIS — E78.5 HYPERLIPIDEMIA, UNSPECIFIED HYPERLIPIDEMIA TYPE: ICD-10-CM

## 2024-07-01 DIAGNOSIS — I10 HYPERTENSION, UNSPECIFIED TYPE: ICD-10-CM

## 2024-07-01 RX ORDER — NIFEDIPINE 30 MG
30 TABLET, EXTENDED RELEASE ORAL DAILY
Qty: 90 TABLET | Refills: 0 | Status: SHIPPED | OUTPATIENT
Start: 2024-07-01 | End: 2024-09-29

## 2024-07-01 RX ORDER — PANTOPRAZOLE SODIUM 40 MG/1
40 TABLET, DELAYED RELEASE ORAL DAILY
Qty: 90 TABLET | Refills: 0 | Status: SHIPPED | OUTPATIENT
Start: 2024-07-01 | End: 2024-09-29

## 2024-07-02 RX ORDER — ATORVASTATIN CALCIUM 10 MG/1
10 TABLET, FILM COATED ORAL DAILY
Qty: 90 TABLET | Refills: 0 | Status: SHIPPED | OUTPATIENT
Start: 2024-07-02 | End: 2024-09-30

## 2024-07-02 RX ORDER — CETIRIZINE HYDROCHLORIDE 10 MG/1
10 TABLET ORAL DAILY
Qty: 90 TABLET | Refills: 3 | Status: SHIPPED | OUTPATIENT
Start: 2024-07-02

## 2024-07-23 ENCOUNTER — TELEPHONE (OUTPATIENT)
Dept: FAMILY MEDICINE | Age: 54
End: 2024-07-23

## 2024-07-23 ENCOUNTER — MED REC SCAN ONLY (OUTPATIENT)
Dept: FAMILY MEDICINE CLINIC | Facility: CLINIC | Age: 54
End: 2024-07-23

## 2024-08-16 ENCOUNTER — MED REC SCAN ONLY (OUTPATIENT)
Dept: FAMILY MEDICINE CLINIC | Facility: CLINIC | Age: 54
End: 2024-08-16

## 2024-09-05 ENCOUNTER — TELEPHONE (OUTPATIENT)
Dept: FAMILY MEDICINE | Age: 54
End: 2024-09-05

## 2024-09-10 ENCOUNTER — TELEPHONE (OUTPATIENT)
Dept: FAMILY MEDICINE | Age: 54
End: 2024-09-10

## 2024-09-10 DIAGNOSIS — Z00.00 LABORATORY EXAMINATION ORDERED AS PART OF A ROUTINE GENERAL MEDICAL EXAMINATION: Primary | ICD-10-CM

## 2024-09-14 ENCOUNTER — LAB SERVICES (OUTPATIENT)
Dept: LAB | Age: 54
End: 2024-09-14

## 2024-09-14 DIAGNOSIS — Z00.00 LABORATORY EXAMINATION ORDERED AS PART OF A ROUTINE GENERAL MEDICAL EXAMINATION: ICD-10-CM

## 2024-09-14 LAB
ALBUMIN SERPL-MCNC: 3.9 G/DL (ref 3.6–5.1)
ALBUMIN/GLOB SERPL: 1.1 {RATIO} (ref 1–2.4)
ALP SERPL-CCNC: 92 UNITS/L (ref 45–117)
ALT SERPL-CCNC: 24 UNITS/L
ANION GAP SERPL CALC-SCNC: 14 MMOL/L (ref 7–19)
AST SERPL-CCNC: 20 UNITS/L
BASOPHILS # BLD: 0.1 K/MCL (ref 0–0.3)
BASOPHILS NFR BLD: 1 %
BILIRUB SERPL-MCNC: 0.7 MG/DL (ref 0.2–1)
BUN SERPL-MCNC: 11 MG/DL (ref 6–20)
BUN/CREAT SERPL: 17 (ref 7–25)
CALCIUM SERPL-MCNC: 9 MG/DL (ref 8.4–10.2)
CHLORIDE SERPL-SCNC: 107 MMOL/L (ref 97–110)
CHOLEST SERPL-MCNC: 189 MG/DL
CHOLEST/HDLC SERPL: 3 {RATIO}
CO2 SERPL-SCNC: 29 MMOL/L (ref 21–32)
CREAT SERPL-MCNC: 0.65 MG/DL (ref 0.51–0.95)
DEPRECATED RDW RBC: 38.8 FL (ref 39–50)
EGFRCR SERPLBLD CKD-EPI 2021: >90 ML/MIN/{1.73_M2}
EOSINOPHIL # BLD: 0.3 K/MCL (ref 0–0.5)
EOSINOPHIL NFR BLD: 5 %
ERYTHROCYTE [DISTWIDTH] IN BLOOD: 13.1 % (ref 11–15)
FASTING DURATION TIME PATIENT: ABNORMAL H
GLOBULIN SER-MCNC: 3.7 G/DL (ref 2–4)
GLUCOSE SERPL-MCNC: 98 MG/DL (ref 70–99)
HBA1C MFR BLD: 5.6 % (ref 4.5–5.6)
HCT VFR BLD CALC: 39.4 % (ref 36–46.5)
HDLC SERPL-MCNC: 62 MG/DL
HGB BLD-MCNC: 12.5 G/DL (ref 12–15.5)
IMM GRANULOCYTES # BLD AUTO: 0 K/MCL (ref 0–0.2)
IMM GRANULOCYTES # BLD: 0 %
LDLC SERPL CALC-MCNC: 118 MG/DL
LYMPHOCYTES # BLD: 2.7 K/MCL (ref 1–4)
LYMPHOCYTES NFR BLD: 50 %
MCH RBC QN AUTO: 25.9 PG (ref 26–34)
MCHC RBC AUTO-ENTMCNC: 31.7 G/DL (ref 32–36.5)
MCV RBC AUTO: 81.6 FL (ref 78–100)
MONOCYTES # BLD: 0.2 K/MCL (ref 0.3–0.9)
MONOCYTES NFR BLD: 4 %
NEUTROPHILS # BLD: 2.2 K/MCL (ref 1.8–7.7)
NEUTROPHILS NFR BLD: 40 %
NONHDLC SERPL-MCNC: 127 MG/DL
NRBC BLD MANUAL-RTO: 0 /100 WBC
PLATELET # BLD AUTO: 347 K/MCL (ref 140–450)
POTASSIUM SERPL-SCNC: 4 MMOL/L (ref 3.4–5.1)
PROT SERPL-MCNC: 7.6 G/DL (ref 6.4–8.2)
RBC # BLD: 4.83 MIL/MCL (ref 4–5.2)
SODIUM SERPL-SCNC: 146 MMOL/L (ref 135–145)
TRIGL SERPL-MCNC: 47 MG/DL
TSH SERPL-ACNC: 1.26 MCUNITS/ML (ref 0.35–5)
WBC # BLD: 5.5 K/MCL (ref 4.2–11)

## 2024-09-14 PROCEDURE — 80050 GENERAL HEALTH PANEL: CPT | Performed by: INTERNAL MEDICINE

## 2024-09-14 PROCEDURE — 80061 LIPID PANEL: CPT | Performed by: INTERNAL MEDICINE

## 2024-09-14 PROCEDURE — 36415 COLL VENOUS BLD VENIPUNCTURE: CPT | Performed by: NURSE PRACTITIONER

## 2024-09-14 PROCEDURE — 83036 HEMOGLOBIN GLYCOSYLATED A1C: CPT | Performed by: INTERNAL MEDICINE

## 2024-09-14 SDOH — HEALTH STABILITY: PHYSICAL HEALTH: ON AVERAGE, HOW MANY DAYS PER WEEK DO YOU ENGAGE IN MODERATE TO STRENUOUS EXERCISE (LIKE A BRISK WALK)?: 6 DAYS

## 2024-09-14 SDOH — ECONOMIC STABILITY: GENERAL

## 2024-09-14 SDOH — ECONOMIC STABILITY: FOOD INSECURITY: WITHIN THE PAST 12 MONTHS, THE FOOD YOU BOUGHT JUST DIDN'T LAST AND YOU DIDN'T HAVE MONEY TO GET MORE.: NEVER TRUE

## 2024-09-14 SDOH — ECONOMIC STABILITY: HOUSING INSECURITY: DO YOU HAVE PROBLEMS WITH ANY OF THE FOLLOWING?: NONE OF THE ABOVE

## 2024-09-14 SDOH — ECONOMIC STABILITY: TRANSPORTATION INSECURITY
IN THE PAST 12 MONTHS, HAS LACK OF RELIABLE TRANSPORTATION KEPT YOU FROM MEDICAL APPOINTMENTS, MEETINGS, WORK OR FROM GETTING THINGS NEEDED FOR DAILY LIVING?: NO

## 2024-09-14 SDOH — HEALTH STABILITY: PHYSICAL HEALTH: ON AVERAGE, HOW MANY MINUTES DO YOU ENGAGE IN EXERCISE AT THIS LEVEL?: 30 MIN

## 2024-09-14 SDOH — SOCIAL STABILITY: SOCIAL NETWORK
HOW OFTEN DO YOU SEE OR TALK TO PEOPLE THAT YOU CARE ABOUT AND FEEL CLOSE TO? (FOR EXAMPLE: TALKING TO FRIENDS ON THE PHONE, VISITING FRIENDS OR FAMILY, GOING TO CHURCH OR CLUB MEETINGS): 5 OR MORE TIMES A WEEK

## 2024-09-14 SDOH — ECONOMIC STABILITY: HOUSING INSECURITY: WHAT IS YOUR LIVING SITUATION TODAY?: I HAVE A STEADY PLACE TO LIVE

## 2024-09-14 ASSESSMENT — LIFESTYLE VARIABLES
HOW OFTEN DO YOU HAVE A DRINK CONTAINING ALCOHOL: NEVER
AUDIT-C TOTAL SCORE: 0
HOW MANY STANDARD DRINKS CONTAINING ALCOHOL DO YOU HAVE ON A TYPICAL DAY: 0,1 OR 2

## 2024-09-14 ASSESSMENT — SOCIAL DETERMINANTS OF HEALTH (SDOH): IN THE PAST 12 MONTHS, HAS THE ELECTRIC, GAS, OIL, OR WATER COMPANY THREATENED TO SHUT OFF SERVICE IN YOUR HOME?: NO

## 2024-09-16 ENCOUNTER — APPOINTMENT (OUTPATIENT)
Dept: FAMILY MEDICINE | Age: 54
End: 2024-09-16

## 2024-09-16 VITALS
TEMPERATURE: 97.8 F | OXYGEN SATURATION: 97 % | DIASTOLIC BLOOD PRESSURE: 70 MMHG | SYSTOLIC BLOOD PRESSURE: 110 MMHG | WEIGHT: 115.2 LBS | HEART RATE: 85 BPM | BODY MASS INDEX: 21.07 KG/M2

## 2024-09-16 DIAGNOSIS — E78.5 HYPERLIPIDEMIA, UNSPECIFIED HYPERLIPIDEMIA TYPE: ICD-10-CM

## 2024-09-16 DIAGNOSIS — I10 HYPERTENSION, UNSPECIFIED TYPE: Primary | ICD-10-CM

## 2024-09-16 DIAGNOSIS — K21.9 GASTROESOPHAGEAL REFLUX DISEASE, UNSPECIFIED WHETHER ESOPHAGITIS PRESENT: ICD-10-CM

## 2024-09-16 PROCEDURE — G2211 COMPLEX E/M VISIT ADD ON: HCPCS | Performed by: NURSE PRACTITIONER

## 2024-09-16 PROCEDURE — 3078F DIAST BP <80 MM HG: CPT | Performed by: NURSE PRACTITIONER

## 2024-09-16 PROCEDURE — 3074F SYST BP LT 130 MM HG: CPT | Performed by: NURSE PRACTITIONER

## 2024-09-16 PROCEDURE — 99214 OFFICE O/P EST MOD 30 MIN: CPT | Performed by: NURSE PRACTITIONER

## 2024-09-16 RX ORDER — ATORVASTATIN CALCIUM 10 MG/1
10 TABLET, FILM COATED ORAL DAILY
Qty: 90 TABLET | Refills: 3 | Status: SHIPPED | OUTPATIENT
Start: 2024-09-16 | End: 2025-09-11

## 2024-09-16 RX ORDER — NIFEDIPINE 30 MG
30 TABLET, EXTENDED RELEASE ORAL DAILY
Qty: 90 TABLET | Refills: 3 | Status: SHIPPED | OUTPATIENT
Start: 2024-09-16 | End: 2025-09-11

## 2024-09-16 RX ORDER — PANTOPRAZOLE SODIUM 40 MG/1
40 TABLET, DELAYED RELEASE ORAL DAILY
Qty: 90 TABLET | Refills: 3 | Status: SHIPPED | OUTPATIENT
Start: 2024-09-16 | End: 2025-09-11

## 2024-09-16 RX ORDER — LOSARTAN POTASSIUM 25 MG/1
25 TABLET ORAL DAILY
Qty: 90 TABLET | Refills: 1 | Status: SHIPPED | OUTPATIENT
Start: 2024-09-16

## 2024-09-16 ASSESSMENT — ENCOUNTER SYMPTOMS
PSYCHIATRIC NEGATIVE: 1
CONSTITUTIONAL NEGATIVE: 1
HEADACHES: 1
RESPIRATORY NEGATIVE: 1
EYES NEGATIVE: 1
GASTROINTESTINAL NEGATIVE: 1

## 2024-09-16 ASSESSMENT — PATIENT HEALTH QUESTIONNAIRE - PHQ9
2. FEELING DOWN, DEPRESSED OR HOPELESS: NOT AT ALL
SUM OF ALL RESPONSES TO PHQ9 QUESTIONS 1 AND 2: 0
1. LITTLE INTEREST OR PLEASURE IN DOING THINGS: NOT AT ALL
SUM OF ALL RESPONSES TO PHQ9 QUESTIONS 1 AND 2: 0
CLINICAL INTERPRETATION OF PHQ2 SCORE: NO FURTHER SCREENING NEEDED

## 2024-12-23 ENCOUNTER — EXTERNAL LAB (OUTPATIENT)
Dept: HEALTH INFORMATION MANAGEMENT | Facility: OTHER | Age: 54
End: 2024-12-23

## 2024-12-23 LAB
ALBUMIN SERPL-MCNC: 4.4 G/DL (ref 3.6–5.1)
ALBUMIN/GLOB SERPL: 1.4 {RATIO} (ref 1–2.5)
ALP SERPL-CCNC: 93 U/L (ref 37–153)
ALT SERPL-CCNC: 14 U/L (ref 6–29)
AST SERPL-CCNC: 18 U/L (ref 10–35)
BASOPHILS # BLD: 51 CELLS/UL (ref 0–200)
BASOPHILS NFR BLD: 1 %
BILIRUB CONJ SERPL-MCNC: 0.1 MG/DL
BILIRUB SERPL-MCNC: 0.6 MG/DL (ref 0.2–1.2)
BUN SERPL-MCNC: 14 MG/DL (ref 7–25)
BUN/CREAT SERPL: NORMAL (CALC) (ref 6–22)
CALCIUM SERPL-MCNC: 9.3 MG/DL (ref 8.6–10.4)
CHLORIDE SERPL-SCNC: 104 MMOL/L (ref 98–110)
CO2 SERPL-SCNC: 26 MMOL/L (ref 20–32)
CREAT SERPL-MCNC: 0.54 MG/DL (ref 0.5–1.03)
EOSINOPHIL # BLD: 204 CELLS/UL (ref 15–500)
EOSINOPHIL NFR BLD: 4 %
ERYTHROCYTE [DISTWIDTH] IN BLOOD: 13.1 % (ref 11–15)
GFR SERPLBLD SCHWARTZ-ARVRAT: 110 ML/MIN/1.73M2
GLOBULIN SER-MCNC: 3.2 G/DL (CALC) (ref 1.9–3.7)
GLUCOSE SERPL-MCNC: 94 MG/DL (ref 65–99)
HCT VFR BLD CALC: 42.7 % (ref 35–45)
HGB BLD-MCNC: 13.1 G/DL (ref 11.7–15.5)
INDIRECT BILIRUBIN: 0.5 MG/DL (CALC) (ref 0.2–1.2)
LAB RESULT: NORMAL
LENGTH OF FAST TIME PATIENT: YES H
LYMPHOCYTES # BLD: 2275 CELLS/UL (ref 850–3900)
LYMPHOCYTES NFR BLD: 44.6 %
MCH RBC QN AUTO: 26.3 PG (ref 27–33)
MCHC RBC AUTO-ENTMCNC: 30.7 G/DL (ref 32–36)
MCV RBC AUTO: 85.7 FL (ref 80–100)
MONOCYTES # BLD: 184 CELLS/UL (ref 200–950)
MONOCYTES NFR BLD: 3.6 %
NEUTROPHILS # BLD: 2387 CELLS/UL (ref 1500–7800)
NEUTROPHILS NFR BLD: 46.8 %
PLATELET # BLD: 426 THOUSAND/UL (ref 140–400)
PMV BLD AUTO: 9.4 FL (ref 7.5–12.5)
POTASSIUM SERPL-SCNC: 4.3 MMOL/L (ref 3.5–5.3)
PROT SERPL-MCNC: 7.6 G/DL (ref 6.1–8.1)
RBC # BLD: 4.98 MILLION/UL (ref 3.8–5.1)
SODIUM SERPL-SCNC: 140 MMOL/L (ref 135–146)
WBC # BLD: 5.1 THOUSAND/UL (ref 3.8–10.8)

## 2024-12-31 ENCOUNTER — TELEPHONE (OUTPATIENT)
Dept: FAMILY MEDICINE CLINIC | Facility: CLINIC | Age: 54
End: 2024-12-31

## 2024-12-31 DIAGNOSIS — Z09 FOLLOW-UP EXAM: ICD-10-CM

## 2024-12-31 DIAGNOSIS — L40.9 PSORIASIS: Primary | ICD-10-CM

## 2024-12-31 NOTE — TELEPHONE ENCOUNTER
Received fax from [facility] (Dr. Sadia Downs) regarding patient's labs completed at [facility] on 12/23/24  - Dr. Dunn to review      Fax from [facility]  \"Appointment: 02/17/25 at 10:20am; Provider: Debora Allen MD\"    Need to clarify with patient if needs referral for Dr. Allen?

## 2025-01-02 NOTE — TELEPHONE ENCOUNTER
Reviewed patient's chart -    LOV 04/27/22 with Dr. Lopez    Patient needs appt to establish care with Dr. Dunn for referral order    Please schedule. Thank you

## 2025-01-02 NOTE — TELEPHONE ENCOUNTER
Left message to voicemail (per verbal release form consent, NO identifying message to confirm.)  Advised patient to call office back [phone] - need to discuss note below.

## 2025-01-03 ENCOUNTER — TELEPHONE (OUTPATIENT)
Dept: FAMILY MEDICINE CLINIC | Facility: CLINIC | Age: 55
End: 2025-01-03

## 2025-01-03 NOTE — TELEPHONE ENCOUNTER
See previous encounter    Patient called here in error    She now has an [facility] provider and will contact them for referral

## 2025-01-06 ENCOUNTER — PATIENT OUTREACH (OUTPATIENT)
Dept: CASE MANAGEMENT | Age: 55
End: 2025-01-06

## 2025-01-06 NOTE — PROCEDURES
The office order for PCP removal request is Approved and finalized on January 6, 2025.    Removed Inocente Velasquez MD as the patient's Primary Care Physician

## 2025-01-11 ENCOUNTER — TELEPHONE (OUTPATIENT)
Dept: FAMILY MEDICINE | Age: 55
End: 2025-01-11

## 2025-01-11 DIAGNOSIS — L40.9 PSORIASIS: Primary | ICD-10-CM

## 2025-02-18 ENCOUNTER — TELEPHONE (OUTPATIENT)
Dept: FAMILY MEDICINE | Age: 55
End: 2025-02-18

## 2025-03-08 DIAGNOSIS — I10 HYPERTENSION, UNSPECIFIED TYPE: ICD-10-CM

## 2025-03-08 RX ORDER — LOSARTAN POTASSIUM 25 MG/1
25 TABLET ORAL DAILY
Qty: 90 TABLET | Refills: 0 | Status: SHIPPED | OUTPATIENT
Start: 2025-03-08 | End: 2025-03-17 | Stop reason: SDUPTHER

## 2025-03-17 ENCOUNTER — APPOINTMENT (OUTPATIENT)
Dept: FAMILY MEDICINE | Age: 55
End: 2025-03-17

## 2025-03-17 VITALS
TEMPERATURE: 97.2 F | BODY MASS INDEX: 21.58 KG/M2 | DIASTOLIC BLOOD PRESSURE: 78 MMHG | SYSTOLIC BLOOD PRESSURE: 122 MMHG | OXYGEN SATURATION: 98 % | HEART RATE: 72 BPM | WEIGHT: 118 LBS

## 2025-03-17 DIAGNOSIS — M25.511 CHRONIC RIGHT SHOULDER PAIN: ICD-10-CM

## 2025-03-17 DIAGNOSIS — Z23 NEED FOR PNEUMOCOCCAL VACCINATION: ICD-10-CM

## 2025-03-17 DIAGNOSIS — M54.2 NECK PAIN ON RIGHT SIDE: ICD-10-CM

## 2025-03-17 DIAGNOSIS — I10 HYPERTENSION, UNSPECIFIED TYPE: Primary | ICD-10-CM

## 2025-03-17 DIAGNOSIS — G89.29 CHRONIC RIGHT SHOULDER PAIN: ICD-10-CM

## 2025-03-17 DIAGNOSIS — K21.9 GASTROESOPHAGEAL REFLUX DISEASE, UNSPECIFIED WHETHER ESOPHAGITIS PRESENT: ICD-10-CM

## 2025-03-17 DIAGNOSIS — Z12.31 ENCOUNTER FOR SCREENING MAMMOGRAM FOR MALIGNANT NEOPLASM OF BREAST: ICD-10-CM

## 2025-03-17 DIAGNOSIS — Z23 NEED FOR TDAP VACCINATION: ICD-10-CM

## 2025-03-17 DIAGNOSIS — E78.5 HYPERLIPIDEMIA, UNSPECIFIED HYPERLIPIDEMIA TYPE: ICD-10-CM

## 2025-03-17 PROCEDURE — 99214 OFFICE O/P EST MOD 30 MIN: CPT | Performed by: NURSE PRACTITIONER

## 2025-03-17 PROCEDURE — 90471 IMMUNIZATION ADMIN: CPT | Performed by: NURSE PRACTITIONER

## 2025-03-17 PROCEDURE — 90677 PCV20 VACCINE IM: CPT | Performed by: NURSE PRACTITIONER

## 2025-03-17 PROCEDURE — G2211 COMPLEX E/M VISIT ADD ON: HCPCS | Performed by: NURSE PRACTITIONER

## 2025-03-17 PROCEDURE — 90715 TDAP VACCINE 7 YRS/> IM: CPT | Performed by: NURSE PRACTITIONER

## 2025-03-17 PROCEDURE — 3074F SYST BP LT 130 MM HG: CPT | Performed by: NURSE PRACTITIONER

## 2025-03-17 PROCEDURE — 90472 IMMUNIZATION ADMIN EACH ADD: CPT | Performed by: NURSE PRACTITIONER

## 2025-03-17 PROCEDURE — 3078F DIAST BP <80 MM HG: CPT | Performed by: NURSE PRACTITIONER

## 2025-03-17 RX ORDER — LOSARTAN POTASSIUM 25 MG/1
25 TABLET ORAL DAILY
Qty: 90 TABLET | Refills: 1 | Status: SHIPPED | OUTPATIENT
Start: 2025-03-17

## 2025-03-17 ASSESSMENT — ENCOUNTER SYMPTOMS
GASTROINTESTINAL NEGATIVE: 1
EYES NEGATIVE: 1
PSYCHIATRIC NEGATIVE: 1
RESPIRATORY NEGATIVE: 1
CONSTITUTIONAL NEGATIVE: 1
NEUROLOGICAL NEGATIVE: 1

## 2025-03-17 ASSESSMENT — PATIENT HEALTH QUESTIONNAIRE - PHQ9
2. FEELING DOWN, DEPRESSED OR HOPELESS: NOT AT ALL
SUM OF ALL RESPONSES TO PHQ9 QUESTIONS 1 AND 2: 0
CLINICAL INTERPRETATION OF PHQ2 SCORE: NO FURTHER SCREENING NEEDED
SUM OF ALL RESPONSES TO PHQ9 QUESTIONS 1 AND 2: 0
1. LITTLE INTEREST OR PLEASURE IN DOING THINGS: NOT AT ALL

## 2025-03-20 ENCOUNTER — TELEPHONE (OUTPATIENT)
Dept: FAMILY MEDICINE | Age: 55
End: 2025-03-20

## 2025-04-07 ENCOUNTER — TELEPHONE (OUTPATIENT)
Dept: FAMILY MEDICINE | Age: 55
End: 2025-04-07

## 2025-05-21 DIAGNOSIS — N32.81 OVERACTIVE BLADDER: ICD-10-CM

## 2025-05-22 RX ORDER — TOLTERODINE 2 MG/1
2 CAPSULE, EXTENDED RELEASE ORAL DAILY
Qty: 90 CAPSULE | Refills: 0 | Status: SHIPPED | OUTPATIENT
Start: 2025-05-22

## 2025-06-23 ENCOUNTER — IMAGING SERVICES (OUTPATIENT)
Dept: GENERAL RADIOLOGY | Age: 55
End: 2025-06-23
Attending: NURSE PRACTITIONER

## 2025-06-23 ENCOUNTER — APPOINTMENT (OUTPATIENT)
Dept: MAMMOGRAPHY | Age: 55
End: 2025-06-23
Attending: NURSE PRACTITIONER

## 2025-06-23 ENCOUNTER — RESULTS FOLLOW-UP (OUTPATIENT)
Dept: FAMILY MEDICINE | Age: 55
End: 2025-06-23

## 2025-06-23 DIAGNOSIS — M54.2 NECK PAIN ON RIGHT SIDE: ICD-10-CM

## 2025-06-23 DIAGNOSIS — G89.29 CHRONIC RIGHT SHOULDER PAIN: Primary | ICD-10-CM

## 2025-06-23 DIAGNOSIS — Z12.31 ENCOUNTER FOR SCREENING MAMMOGRAM FOR MALIGNANT NEOPLASM OF BREAST: ICD-10-CM

## 2025-06-23 DIAGNOSIS — G89.29 CHRONIC RIGHT SHOULDER PAIN: ICD-10-CM

## 2025-06-23 DIAGNOSIS — M25.511 CHRONIC RIGHT SHOULDER PAIN: ICD-10-CM

## 2025-06-23 DIAGNOSIS — M25.511 CHRONIC RIGHT SHOULDER PAIN: Primary | ICD-10-CM

## 2025-06-23 PROCEDURE — 72050 X-RAY EXAM NECK SPINE 4/5VWS: CPT | Performed by: RADIOLOGY

## 2025-06-23 PROCEDURE — 77063 BREAST TOMOSYNTHESIS BI: CPT | Performed by: RADIOLOGY

## 2025-06-23 PROCEDURE — 77067 SCR MAMMO BI INCL CAD: CPT | Performed by: RADIOLOGY

## 2025-08-04 ENCOUNTER — APPOINTMENT (OUTPATIENT)
Dept: FAMILY MEDICINE | Age: 55
End: 2025-08-04

## 2025-08-04 VITALS
HEIGHT: 61 IN | DIASTOLIC BLOOD PRESSURE: 80 MMHG | SYSTOLIC BLOOD PRESSURE: 132 MMHG | HEART RATE: 84 BPM | BODY MASS INDEX: 22.19 KG/M2 | OXYGEN SATURATION: 96 % | WEIGHT: 117.5 LBS

## 2025-08-04 DIAGNOSIS — K21.9 GASTROESOPHAGEAL REFLUX DISEASE, UNSPECIFIED WHETHER ESOPHAGITIS PRESENT: ICD-10-CM

## 2025-08-04 DIAGNOSIS — R42 DIZZINESS: ICD-10-CM

## 2025-08-04 DIAGNOSIS — Z00.01 ENCOUNTER FOR GENERAL ADULT MEDICAL EXAMINATION WITH ABNORMAL FINDINGS: Primary | ICD-10-CM

## 2025-08-04 DIAGNOSIS — E78.5 HYPERLIPIDEMIA, UNSPECIFIED HYPERLIPIDEMIA TYPE: ICD-10-CM

## 2025-08-04 DIAGNOSIS — N32.81 OVERACTIVE BLADDER: ICD-10-CM

## 2025-08-04 DIAGNOSIS — I10 HYPERTENSION, UNSPECIFIED TYPE: ICD-10-CM

## 2025-08-04 DIAGNOSIS — L40.9 PSORIASIS: ICD-10-CM

## 2025-08-04 RX ORDER — ATORVASTATIN CALCIUM 10 MG/1
10 TABLET, FILM COATED ORAL DAILY
Qty: 90 TABLET | Refills: 3 | Status: SHIPPED | OUTPATIENT
Start: 2025-08-04 | End: 2026-07-30

## 2025-08-04 RX ORDER — CETIRIZINE HYDROCHLORIDE 10 MG/1
10 TABLET ORAL DAILY
Qty: 90 TABLET | Refills: 3 | Status: SHIPPED | OUTPATIENT
Start: 2025-08-04

## 2025-08-04 RX ORDER — PANTOPRAZOLE SODIUM 40 MG/1
40 TABLET, DELAYED RELEASE ORAL DAILY
Qty: 90 TABLET | Refills: 3 | Status: SHIPPED | OUTPATIENT
Start: 2025-08-04 | End: 2026-07-30

## 2025-08-04 RX ORDER — NIFEDIPINE 30 MG
30 TABLET, EXTENDED RELEASE ORAL DAILY
Qty: 90 TABLET | Refills: 3 | Status: SHIPPED | OUTPATIENT
Start: 2025-08-04 | End: 2026-07-30

## 2025-08-04 RX ORDER — LOSARTAN POTASSIUM 25 MG/1
25 TABLET ORAL DAILY
Qty: 90 TABLET | Refills: 3 | Status: SHIPPED | OUTPATIENT
Start: 2025-08-04

## 2025-08-04 RX ORDER — TOLTERODINE 2 MG/1
2 CAPSULE, EXTENDED RELEASE ORAL DAILY
Qty: 90 CAPSULE | Refills: 3 | Status: SHIPPED | OUTPATIENT
Start: 2025-08-04

## 2025-08-04 ASSESSMENT — PATIENT HEALTH QUESTIONNAIRE - PHQ9
SUM OF ALL RESPONSES TO PHQ9 QUESTIONS 1 AND 2: 0
CLINICAL INTERPRETATION OF PHQ2 SCORE: NO FURTHER SCREENING NEEDED
2. FEELING DOWN, DEPRESSED OR HOPELESS: NOT AT ALL
1. LITTLE INTEREST OR PLEASURE IN DOING THINGS: NOT AT ALL
SUM OF ALL RESPONSES TO PHQ9 QUESTIONS 1 AND 2: 0

## 2025-08-05 ENCOUNTER — LAB SERVICES (OUTPATIENT)
Dept: LAB | Age: 55
End: 2025-08-05

## 2025-08-05 DIAGNOSIS — I10 HYPERTENSION, UNSPECIFIED TYPE: ICD-10-CM

## 2025-08-05 DIAGNOSIS — E78.5 HYPERLIPIDEMIA, UNSPECIFIED HYPERLIPIDEMIA TYPE: ICD-10-CM

## 2025-08-05 DIAGNOSIS — K21.9 GASTROESOPHAGEAL REFLUX DISEASE, UNSPECIFIED WHETHER ESOPHAGITIS PRESENT: ICD-10-CM

## 2025-08-05 LAB
ALBUMIN SERPL-MCNC: 3.8 G/DL (ref 3.4–5)
ALBUMIN/GLOB SERPL: 1.1 {RATIO} (ref 1–2.4)
ALP SERPL-CCNC: 87 UNITS/L (ref 45–117)
ALT SERPL-CCNC: 26 UNITS/L
ANION GAP SERPL CALC-SCNC: 12 MMOL/L (ref 7–19)
AST SERPL-CCNC: 19 UNITS/L
BASOPHILS # BLD: 0.1 K/MCL (ref 0–0.3)
BASOPHILS NFR BLD: 1 %
BILIRUB SERPL-MCNC: 0.6 MG/DL (ref 0.2–1)
BUN SERPL-MCNC: 13 MG/DL (ref 6–20)
BUN/CREAT SERPL: 19 (ref 7–25)
CALCIUM SERPL-MCNC: 9.3 MG/DL (ref 8.4–10.2)
CHLORIDE SERPL-SCNC: 104 MMOL/L (ref 97–110)
CHOLEST SERPL-MCNC: 211 MG/DL
CHOLEST/HDLC SERPL: 3.2 {RATIO}
CO2 SERPL-SCNC: 30 MMOL/L (ref 21–32)
CREAT SERPL-MCNC: 0.67 MG/DL (ref 0.51–0.95)
DEPRECATED RDW RBC: 39.2 FL (ref 39–50)
EGFRCR SERPLBLD CKD-EPI 2021: >90 ML/MIN/{1.73_M2}
EOSINOPHIL # BLD: 0.3 K/MCL (ref 0–0.5)
EOSINOPHIL NFR BLD: 6 %
ERYTHROCYTE [DISTWIDTH] IN BLOOD: 13.1 % (ref 11–15)
FASTING DURATION TIME PATIENT: NORMAL H
GLOBULIN SER-MCNC: 3.5 G/DL (ref 2–4)
GLUCOSE SERPL-MCNC: 92 MG/DL (ref 70–99)
HCT VFR BLD CALC: 39 % (ref 36–46.5)
HDLC SERPL-MCNC: 65 MG/DL
HGB BLD-MCNC: 12.5 G/DL (ref 12–15.5)
IMM GRANULOCYTES # BLD AUTO: 0 K/MCL (ref 0–0.2)
IMM GRANULOCYTES # BLD: 0 %
LDLC SERPL CALC-MCNC: 129 MG/DL
LYMPHOCYTES # BLD: 2.7 K/MCL (ref 1–4)
LYMPHOCYTES NFR BLD: 49 %
MCH RBC QN AUTO: 26.4 PG (ref 26–34)
MCHC RBC AUTO-ENTMCNC: 32.1 G/DL (ref 32–36.5)
MCV RBC AUTO: 82.5 FL (ref 78–100)
MONOCYTES # BLD: 0.3 K/MCL (ref 0.3–0.9)
MONOCYTES NFR BLD: 5 %
NEUTROPHILS # BLD: 2.1 K/MCL (ref 1.8–7.7)
NEUTROPHILS NFR BLD: 39 %
NONHDLC SERPL-MCNC: 146 MG/DL
NRBC BLD MANUAL-RTO: 0 /100 WBC
PLATELET # BLD AUTO: 366 K/MCL (ref 140–450)
POTASSIUM SERPL-SCNC: 3.8 MMOL/L (ref 3.4–5.1)
PROT SERPL-MCNC: 7.3 G/DL (ref 6.4–8.2)
RBC # BLD: 4.73 MIL/MCL (ref 4–5.2)
SODIUM SERPL-SCNC: 142 MMOL/L (ref 135–145)
TRIGL SERPL-MCNC: 83 MG/DL
WBC # BLD: 5.4 K/MCL (ref 4.2–11)

## 2025-08-05 PROCEDURE — 80053 COMPREHEN METABOLIC PANEL: CPT | Performed by: INTERNAL MEDICINE

## 2025-08-05 PROCEDURE — 80061 LIPID PANEL: CPT | Performed by: INTERNAL MEDICINE

## 2025-08-05 PROCEDURE — 85025 COMPLETE CBC W/AUTO DIFF WBC: CPT | Performed by: INTERNAL MEDICINE

## 2025-08-05 PROCEDURE — 36415 COLL VENOUS BLD VENIPUNCTURE: CPT | Performed by: FAMILY MEDICINE

## (undated) DIAGNOSIS — Z12.11 ENCOUNTER FOR SCREENING COLONOSCOPY: Primary | ICD-10-CM

## (undated) DIAGNOSIS — Z87.19 HISTORY OF COLITIS: ICD-10-CM

## (undated) NOTE — LETTER
06/06/22      Yumiko Doctor   [address]           Dear Charles Adams records indicate that you have outstanding lab work and or testing that was ordered for you and has not yet been completed:  Lab Frequency Next Occurrence   LEOLA SCREENING BILAT (CPT=77067) Once 04/27/2022      To provide you with the best possible care, please complete these orders at your earliest convenience. If you have recently completed these orders please disregard this letter. Please call Central Scheduling to schedule your test at [phone].     Thank you,     [facility]